# Patient Record
Sex: MALE | Race: WHITE | NOT HISPANIC OR LATINO
[De-identification: names, ages, dates, MRNs, and addresses within clinical notes are randomized per-mention and may not be internally consistent; named-entity substitution may affect disease eponyms.]

---

## 2022-07-27 PROBLEM — Z00.00 ENCOUNTER FOR PREVENTIVE HEALTH EXAMINATION: Status: ACTIVE | Noted: 2022-07-27

## 2022-07-28 ENCOUNTER — APPOINTMENT (OUTPATIENT)
Dept: ORTHOPEDIC SURGERY | Facility: CLINIC | Age: 67
End: 2022-07-28

## 2022-07-28 VITALS — HEIGHT: 71 IN | WEIGHT: 260 LBS | BODY MASS INDEX: 36.4 KG/M2

## 2022-07-28 DIAGNOSIS — Z86.79 PERSONAL HISTORY OF OTHER DISEASES OF THE CIRCULATORY SYSTEM: ICD-10-CM

## 2022-07-28 DIAGNOSIS — Z63.5 DISRUPTION OF FAMILY BY SEPARATION AND DIVORCE: ICD-10-CM

## 2022-07-28 DIAGNOSIS — F10.21 ALCOHOL DEPENDENCE, IN REMISSION: ICD-10-CM

## 2022-07-28 DIAGNOSIS — Z78.9 OTHER SPECIFIED HEALTH STATUS: ICD-10-CM

## 2022-07-28 DIAGNOSIS — Z86.39 PERSONAL HISTORY OF OTHER ENDOCRINE, NUTRITIONAL AND METABOLIC DISEASE: ICD-10-CM

## 2022-07-28 PROCEDURE — 99203 OFFICE O/P NEW LOW 30 MIN: CPT

## 2022-07-28 PROCEDURE — 73562 X-RAY EXAM OF KNEE 3: CPT | Mod: 50

## 2022-07-28 SDOH — SOCIAL STABILITY - SOCIAL INSECURITY: DISRUPTION OF FAMILY BY SEPARATION AND DIVORCE: Z63.5

## 2022-07-28 NOTE — PROCEDURE
[Large Joint Injection] : Large joint injection [Bilateral] : bilaterally of the [Knee] : knee [Pain] : pain [Inflammation] : inflammation [X-ray evidence of Osteoarthritis on this or prior visit] : x-ray evidence of Osteoarthritis on this or prior visit [Alcohol] : alcohol [Ethyl Chloride sprayed topically] : ethyl chloride sprayed topically [Sterile technique used] : sterile technique used [___ cc    3mg] :  Betamethasone (Celestone) ~Vcc of 3mg [___ cc    1%] : Lidocaine ~Vcc of 1%  [] : Patient tolerated procedure well [Call if redness, pain or fever occur] : call if redness, pain or fever occur [Apply ice for 15min out of every hour for the next 12-24 hours as tolerated] : apply ice for 15 minutes out of every hour for the next 12-24 hours as tolerated [Previous OTC use and PT nontherapeutic] : patient has tried OTC's including aspirin, Ibuprofen, Aleve, etc or prescription NSAIDS, and/or exercises at home and/or physical therapy without satisfactory response [Patient had decreased mobility in the joint] : patient had decreased mobility in the joint [Risks, benefits, alternatives discussed / Verbal consent obtained] : the risks benefits, and alternatives have been discussed, and verbal consent was obtained

## 2022-07-28 NOTE — WORK
[Other: ___] : [unfilled] [Are consistent with the injury] : are consistent with the injury [Consistent with my objective findings] : consistent with my objective findings [Reveals pre-existing condition(s) that may affect treatment/prognosis] : reveals pre-existing condition(s) that may affect treatment/prognosis [Can return to work without limitations on ______] : can return to work without limitations on [unfilled] [Patient] : patient [I provided the services listed above] :  I provided the services listed above. [No Rx restrictions] : No Rx restrictions. [Very Heavy Work:] : Very Heavy Work: Exerting in excess of 100 pounds of force occasionally, and/or in excess of 50 pounds of force frequently, and/or in excess of 20 pounds of force constantly to move objects. Physical demand requirements are in excess of those for Heavy Work [FreeTextEntry1] : no temporary impairment [FreeTextEntry2] : arthritis

## 2022-07-28 NOTE — ASSESSMENT
[FreeTextEntry1] : 68 yo male presenting with bilateral knee oa, left knee severe bone on bone oa with medial joint space narrowing and varus deformity, right knee with moderate to severe oa with medial joint space narrowing and varus alignment\par -Discussed with patient that he is indicated for left TKA, not ready for surgery at this time\par -Discussed risks, benefits, alternatives of bilateral knee intraarticular CSI, patient tolerated well\par -Discussed with patient that they are unable to have surgery for at least three months after CSI due to increased risk of infection. Patient understands.\par -Activities as tolerated\par -Rest, ice, compression, elevation, NSAIDs PRN for pain\par -All questions answered\par -F/u 2 months\par

## 2022-07-28 NOTE — HISTORY OF PRESENT ILLNESS
[Work related] : work related [7] : 7 [5] : 5 [Dull/Aching] : dull/aching [Sharp] : sharp [Throbbing] : throbbing [Intermittent] : intermittent [Household chores] : household chores [Leisure] : leisure [Sleep] : sleep [Social interactions] : social interactions [Rest] : rest [Full time] : Work status: full time [de-identified] : Patient is here for bilat knees. Patient states he tripped over an open manhole cover and fell on 11/2016. Patient states he saw a doctor at [facility]. Patient states his left knee is worse than right knee. Patient states NKI. Patient states he gets medial pain in the left knee and is lacking strength and ROM. Patient states the right knee has a dull ache.  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

## 2022-07-28 NOTE — PHYSICAL EXAM
[de-identified] : Examination of the bilateral knees is as follows:\par INSPECTION: mild effusion, but no ecchymosis, no erythema, no atrophy, no deformities of quad tendon or of patellar tendon\par PALPATION: medial joint line tenderness, lateral joint line tenderness, medial and lateral patellar facet tenderness, but no palpable mass, no obvious defects, no increased warmth, no calf tenderness\par ROM: flexion 120 degrees, but extension 0 degrees\par STRENGTH: quadriceps 5/5, hamstring 5/5\par NEURO: light touch is intact throughout\par GAIT: antalgic, but patient ambulates without assistive device\par \par X-rays of the left knee is as follows: \par Knee 3 view in the anteroposterior, lateral, skyline views:\par -Left knee: severe knee oa with medial joint space narrowing, varus alignment\par -Right knee: moderate to severe knee oa with medial joint space narrowing, varus alignment

## 2022-10-04 ENCOUNTER — APPOINTMENT (OUTPATIENT)
Dept: ORTHOPEDIC SURGERY | Facility: CLINIC | Age: 67
End: 2022-10-04

## 2022-10-04 VITALS — BODY MASS INDEX: 36.4 KG/M2 | WEIGHT: 260 LBS | HEIGHT: 71 IN

## 2022-10-04 DIAGNOSIS — M17.0 BILATERAL PRIMARY OSTEOARTHRITIS OF KNEE: ICD-10-CM

## 2022-10-04 PROCEDURE — 99455 WORK RELATED DISABILITY EXAM: CPT

## 2022-10-04 PROCEDURE — 99072 ADDL SUPL MATRL&STAF TM PHE: CPT

## 2022-10-04 NOTE — ASSESSMENT
[FreeTextEntry1] : 66 yo male presenting for SLU visit of bilateral knee oa, left knee worse than the right\par -Discussed with patient that he is indicated for left TKA, not ready for surgery at this time\par -Activities as tolerated\par -Rest, ice, compression, elevation, NSAIDs PRN for pain\par -All questions answered\par -F/u PRN\par \par SLU left knee: 10% SLU\par SLU right knee: 10% SLU\par

## 2022-10-04 NOTE — PHYSICAL EXAM
[de-identified] : Examination of the bilateral knees is as follows:\par INSPECTION: mild effusion, but no ecchymosis, no erythema, no atrophy, no deformities of quad tendon or of patellar tendon\par PALPATION: medial joint line tenderness, lateral joint line tenderness, medial and lateral patellar facet tenderness, but no palpable mass, no obvious defects, no increased warmth, no calf tenderness\par ROM: flexion 120 degrees, but extension 0 degrees\par -Left: extension 0 degrees, flexion 115 degrees\par -Right: extension 0 degrees, flexion 120 degrees\par STRENGTH: quadriceps 5/5, hamstring 5/5\par NEURO: light touch is intact throughout\par GAIT: antalgic, but patient ambulates without assistive device\par \par X-rays of the left knee is as follows: \par Knee 3 view in the anteroposterior, lateral, skyline views:\par -Left knee: severe knee oa with medial joint space narrowing, varus alignment\par -Right knee: moderate to severe knee oa with medial joint space narrowing, varus alignment

## 2022-10-04 NOTE — HISTORY OF PRESENT ILLNESS
[Work related] : work related [7] : 7 [3] : 3 [Dull/Aching] : dull/aching [Sharp] : sharp [Throbbing] : throbbing [Intermittent] : intermittent [Household chores] : household chores [Leisure] : leisure [Sleep] : sleep [Social interactions] : social interactions [Rest] : rest [Full time] : Work status: full time [de-identified] : Patient is here for bilat knees. Patient is being treated for Primary osteoarthritis of both knees. Patient had CSI on 7/28/2022. Patient states the injection helped for 6-8 weeks then only discomfort returned. Patient wears a knee brace that helps. Patient is here for an SLU. 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

## 2022-10-04 NOTE — WORK
[Other: ___] : [unfilled] [Are consistent with the injury] : are consistent with the injury [Consistent with my objective findings] : consistent with my objective findings [Reveals pre-existing condition(s) that may affect treatment/prognosis] : reveals pre-existing condition(s) that may affect treatment/prognosis [Can return to work without limitations on ______] : can return to work without limitations on [unfilled] [Patient] : patient [No Rx restrictions] : No Rx restrictions. [I provided the services listed above] :  I provided the services listed above. [Very Heavy Work:] : Very Heavy Work: Exerting in excess of 100 pounds of force occasionally, and/or in excess of 50 pounds of force frequently, and/or in excess of 20 pounds of force constantly to move objects. Physical demand requirements are in excess of those for Heavy Work [FreeTextEntry1] : no temporary impairment [FreeTextEntry2] : arthritis

## 2023-01-03 ENCOUNTER — APPOINTMENT (OUTPATIENT)
Dept: ORTHOPEDIC SURGERY | Facility: CLINIC | Age: 68
End: 2023-01-03
Payer: OTHER MISCELLANEOUS

## 2023-01-03 PROCEDURE — 73610 X-RAY EXAM OF ANKLE: CPT | Mod: RT

## 2023-01-03 PROCEDURE — 99072 ADDL SUPL MATRL&STAF TM PHE: CPT

## 2023-01-03 PROCEDURE — 99213 OFFICE O/P EST LOW 20 MIN: CPT

## 2023-01-03 NOTE — WORK
[Repetitive Strain Injury] : repetitive strain injury [Was the competent medical cause of the injury] : was the competent medical cause of the injury [Are consistent with the injury] : are consistent with the injury [Consistent with my objective findings] : consistent with my objective findings [Does not reveal pre-existing condition(s) that may affect treatment/prognosis] : does not reveal pre-existing condition(s) that may affect treatment/prognosis [Can return to work without limitations on ______] : can return to work without limitations on [unfilled] [Patient] : patient [No Rx restrictions] : No Rx restrictions. [I provided the services listed above] :  I provided the services listed above. [FreeTextEntry1] : no temporary impairment [Very Heavy Work:] : Very Heavy Work: Exerting in excess of 100 pounds of force occasionally, and/or in excess of 50 pounds of force frequently, and/or in excess of 20 pounds of force constantly to move objects. Physical demand requirements are in excess of those for Heavy Work

## 2023-01-03 NOTE — PHYSICAL EXAM
[de-identified] : Examination of the right foot and ankle is as follows:\par INSPECTION: calf muscle atrophy, mild swelling over achilles tendon, but no abrasion, no laceration, no erythema, no ecchymosis, no gross deformity, no ecchymosis of foot or ankle\par PALPATION: tenderness to palpation at midsubstance achilles tendon with associated bump\par ROM: dorsiflexion 15 degrees, plantar flexion 30 degrees, inversion 25 degrees, eversion 15 degrees\par STRENGTH: dorsiflexion 5/5, plantarflexion 3/5, inversion 5/5, eversion 5/5, EHL 5/5, FHL 5/5\par TESTING: negative Farias test\par VASCULAR: dorsalis pedis pulse: 1+, posterior tibialis pulse: 1+\par NEURO: Sensation present to light touch in all distributions\par GAIT: antalgic, but patient ambulates without assistive device\par \par X-rays of the right ankle is as follows: negative for fractures of abnormalities

## 2023-01-03 NOTE — ASSESSMENT
[FreeTextEntry1] : 66 yo male presenting with right midsubstance achilles tendinitis. Patient likely had midsubstance achilles tendon rupture approximately 4 months ago treated with inadequate non-surgical management. Patient with weakness on exam and pain.\par -Rx PT given today\par -Activities as tolerated, avoid strenuous/impact related activities\par -patient able to work full duty without restrictions at this time\par -Rest, ice, compression, elevation, NSAIDs PRN for pain. \par -All questions answered\par -F/u after MRI\par

## 2023-01-03 NOTE — HISTORY OF PRESENT ILLNESS
[Work related] : work related [Sudden] : sudden [9] : 9 [0] : 0 [Dull/Aching] : dull/aching [Shooting] : shooting [Stabbing] : stabbing [Throbbing] : throbbing [Constant] : constant [Household chores] : household chores [Leisure] : leisure [Work] : work [Social interactions] : social interactions [Rest] : rest [Full time] : Work status: full time [de-identified] : Patient is here for his right ankle. Patient states he was mopping a floor and slipped. Patient states he felt a pop in the Achilles on 9/12/2022. Patient states he has constant shooting pain when weight bearing. Patient states he went to PT for lazer treatments for approx 3 months with minimal relief. Patient denies manual PT. 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 Patient denies weakness but reports pain. [] : Post Surgical Visit: no [FreeTextEntry1] : Right ankle  [FreeTextEntry3] : 9/12/2022 [FreeTextEntry5] : Patient states he was mopping a floor and slipped. 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

## 2023-01-11 ENCOUNTER — APPOINTMENT (OUTPATIENT)
Dept: ORTHOPEDIC SURGERY | Facility: CLINIC | Age: 68
End: 2023-01-11
Payer: OTHER MISCELLANEOUS

## 2023-01-11 PROCEDURE — 73030 X-RAY EXAM OF SHOULDER: CPT | Mod: RT

## 2023-01-11 PROCEDURE — 20610 DRAIN/INJ JOINT/BURSA W/O US: CPT | Mod: RT

## 2023-01-11 PROCEDURE — 99072 ADDL SUPL MATRL&STAF TM PHE: CPT | Mod: NC

## 2023-01-11 PROCEDURE — 99214 OFFICE O/P EST MOD 30 MIN: CPT | Mod: 25

## 2023-01-11 RX ORDER — METOPROLOL TARTRATE 75 MG/1
TABLET, FILM COATED ORAL
Refills: 0 | Status: ACTIVE | COMMUNITY

## 2023-01-11 NOTE — DISCUSSION/SUMMARY
[de-identified] : (Assessment)\par \par History, clinical examination and imaging were most consistent with:\par -right glenohumeral osteoarthritis and rotator cuff tendonitis\par \par The diagnosis was explained in detail. The potential non-surgical and surgical treatments were reviewed. The relative risks and benefits of each option were considered relative to the patient’s age, activity level, medical history, symptom severity and previously attempted treatments. \par \par -Non-surgical treatment was selected. \par -Original records pertinent to the right shoulder WC claim are not available for review. As such, it is difficult to assign causality of the injury to patient's current symptoms. He has evidence of degenerative shoulder pathology. It is possible, but not certain, that the workplace injury contributed to the development of his current condition. \par -The patient will proceed with injection and PT for symptom relief for his right shoulder.\par -He is currently undergoing PT for his ankle supervised by Dr. Costa.\par -Should symptoms fail to improve, we discussed MRI to evaluate his rotator cuff and cartilage prior to consideration of surgical options. \par \par \par (Plan)\par \par -Activity modification\par -Injection: performed in the office today, details in procedure note. \par -Follow up: 6 weeks if failing to improve\par \par -Physical therapy: script provided\par -Home exercise program: packet from [employer] provided\par -Meloxicam: once daily with food, GI precautions discussed\par -Ibuprofen: twice daily with food as needed for pain, GI precautions discussed\par -Acetaminophen: three times daily as needed for pain\par -Naproxen: twice daily with food, GI precautions discussed\par -Diclofenac gel: four times daily as needed for pain\par -Physical therapy: continue as per existing script\par -Bracing: patient to obtain over the counter \par -Bracing: DME script provided \par \par \par (MDM) \par \par Problem Complexity\par -Moderate: chronic illness with exacerbation\par \par Risk\par -Moderate: injection\par \par Patient has not been seen by another provider in my practice within the past 2 year who specializes in orthopedic sports medicine, shoulder or elbow surgery. \par \par (Injection Procedure Note)\par \par Laterality \par -Right \par \par Location\par -Glenohumeral joint\par \par Contents\par -40 mg kenalog \par -5 mL of 1% lidocaine\par \par Narrative\par -Discussed possible risks of corticosteroid injection including hyperglycemia, infection and skin discoloration. \par -Discussed that due to infection risk, an interval between injection and any future surgery is 6 weeks for arthroscopy and 3 months for arthroplasty.\par -Informed consent was obtained.\par -Injection performed using aseptic technique. Tolerated well with no complications. \par \par

## 2023-01-11 NOTE — HISTORY OF PRESENT ILLNESS
[de-identified] : (New Visit)\par \par Patient Details\par -Age: 67\par -Occupation: maintenance\par -Worker’s compensation claim: yes\par -No-fault claim: no\par -School injury claim: no  \par \par \par Symptom Details \par -Body part: RT shoulder \par -Duration of symptoms:  6 years ago cannot recall the DOI. he reports exacerbation over the past few months without inciting event\par -How symptoms began: no recent injury, his initial workplace injury he fell and landed on his shoulder and knees. patient denies ever having shoulder pain prior to the workplace injury. \par -Location of pain: anterior\par -Quality of pain: sharp\par -Pain score at rest: 2/10\par -Pain score with activity: 8/10\par -Swelling: no\par -Stiffness:yes \par -Radicular symptoms (numbness or radiating pain down extremity): no\par \par \par Treatment Details\par -Activity modification: yes\par -Medication: aleve prn\par -Physical therapy: not recently\par -Home exercise program: no\par -Injection: not recently\par -MRI: no\par \par

## 2023-01-11 NOTE — WORK
[Repetitive Strain Injury] : repetitive strain injury [Was the competent medical cause of the injury] : was the competent medical cause of the injury [Are consistent with the injury] : are consistent with the injury [Consistent with my objective findings] : consistent with my objective findings [Mild Partial] : mild partial [Reveals pre-existing condition(s) that may affect treatment/prognosis] : reveals pre-existing condition(s) that may affect treatment/prognosis [Can return to work without limitations on ______] : can return to work without limitations on [unfilled] [N/A] : : Not Applicable [Patient] : patient [No Rx restrictions] : No Rx restrictions. [I provided the services listed above] :  I provided the services listed above. [Heavy Work:] : Heavy Work: Exerting 50 to 100 pounds of force occasionally, and/or 25 to 50 pounds of force frequently, and/or 10 to 20 pounds of force constantly to move objects. Physical demand requirements are in excess of those for Medium Work [FreeTextEntry1] : 50% [FreeTextEntry2] : glenohumeral osteoarthritis

## 2023-02-13 ENCOUNTER — FORM ENCOUNTER (OUTPATIENT)
Age: 68
End: 2023-02-13

## 2023-02-14 ENCOUNTER — APPOINTMENT (OUTPATIENT)
Dept: MRI IMAGING | Facility: CLINIC | Age: 68
End: 2023-02-14
Payer: OTHER MISCELLANEOUS

## 2023-02-14 ENCOUNTER — APPOINTMENT (OUTPATIENT)
Dept: ORTHOPEDIC SURGERY | Facility: CLINIC | Age: 68
End: 2023-02-14

## 2023-02-14 PROCEDURE — 99072 ADDL SUPL MATRL&STAF TM PHE: CPT

## 2023-02-14 PROCEDURE — 73721 MRI JNT OF LWR EXTRE W/O DYE: CPT | Mod: RT

## 2023-02-17 ENCOUNTER — APPOINTMENT (OUTPATIENT)
Dept: ORTHOPEDIC SURGERY | Facility: CLINIC | Age: 68
End: 2023-02-17
Payer: OTHER MISCELLANEOUS

## 2023-02-17 VITALS — HEIGHT: 71 IN | WEIGHT: 260 LBS | BODY MASS INDEX: 36.4 KG/M2

## 2023-02-17 DIAGNOSIS — M76.61 ACHILLES TENDINITIS, RIGHT LEG: ICD-10-CM

## 2023-02-17 DIAGNOSIS — M76.821 POSTERIOR TIBIAL TENDINITIS, RIGHT LEG: ICD-10-CM

## 2023-02-17 PROCEDURE — 99213 OFFICE O/P EST LOW 20 MIN: CPT

## 2023-02-17 PROCEDURE — 99072 ADDL SUPL MATRL&STAF TM PHE: CPT

## 2023-02-17 NOTE — DATA REVIEWED
[MRI] : MRI [Right] : of the right [Ankle] : ankle [I independently reviewed and interpreted images and report] : I independently reviewed and interpreted images and report [FreeTextEntry1] : LIBJ; Impression:\par 1) Circumferential enlargement and tendinopathy of the achilles from the myotendinous junction to the superior calcaneal process with a moderate grade centeral and medial 4 cm length tear from the myotendinous junction to the subtalar joint. \par 2) Peritendinous edema and bursitis. 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

## 2023-02-17 NOTE — WORK
[Repetitive Strain Injury] : repetitive strain injury [Was the competent medical cause of the injury] : was the competent medical cause of the injury [Are consistent with the injury] : are consistent with the injury [Consistent with my objective findings] : consistent with my objective findings [Does not reveal pre-existing condition(s) that may affect treatment/prognosis] : does not reveal pre-existing condition(s) that may affect treatment/prognosis [Can return to work without limitations on ______] : can return to work without limitations on [unfilled] [Patient] : patient [No Rx restrictions] : No Rx restrictions. [I provided the services listed above] :  I provided the services listed above. [Very Heavy Work:] : Very Heavy Work: Exerting in excess of 100 pounds of force occasionally, and/or in excess of 50 pounds of force frequently, and/or in excess of 20 pounds of force constantly to move objects. Physical demand requirements are in excess of those for Heavy Work [FreeTextEntry1] : no temporary impairment

## 2023-02-17 NOTE — HISTORY OF PRESENT ILLNESS
[Work related] : work related [Sudden] : sudden [9] : 9 [0] : 0 [Dull/Aching] : dull/aching [Shooting] : shooting [Stabbing] : stabbing [Throbbing] : throbbing [Constant] : constant [Household chores] : household chores [Leisure] : leisure [Work] : work [Social interactions] : social interactions [Rest] : rest [Full time] : Work status: full time [de-identified] : Patient is here to review his right ankle MRI Results at [facility]. Patient admits to feeling constant soreness. Patient admits to extreme soreness and swelling after working all day. Patient denies going to PT due to insurance issues. Patient denies taking pain medication. \par \par Impression:\par 1) Circumferential enlargement and tendinopathy of the achilles from the myotendinous junction to the superior calcaneal process with a moderate grade central and medial 4 cm length tear from the myotendinous junction to the subtalar joint. \par 2) Peritendinous edema and bursitis. 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 [] : Post Surgical Visit: no [FreeTextEntry1] : Right ankle  [FreeTextEntry3] : 9/12/2022 [FreeTextEntry5] : Patient states he was mopping a floor and slipped. 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

## 2023-02-17 NOTE — PHYSICAL EXAM
[de-identified] : Examination of the right foot and ankle is as follows:\par INSPECTION: calf muscle atrophy, mild swelling over achilles tendon, but no abrasion, no laceration, no erythema, no ecchymosis, no gross deformity, no ecchymosis of foot or ankle\par PALPATION: tenderness to palpation at midsubstance achilles tendon with associated bump\par ROM: dorsiflexion 15 degrees, plantar flexion 30 degrees, inversion 25 degrees, eversion 15 degrees\par STRENGTH: dorsiflexion 5/5, plantarflexion 3/5, inversion 5/5, eversion 5/5, EHL 5/5, FHL 5/5\par TESTING: negative Farias test\par VASCULAR: dorsalis pedis pulse: 1+, posterior tibialis pulse: 1+\par NEURO: Sensation present to light touch in all distributions\par GAIT: antalgic, but patient ambulates without assistive device\par \par X-rays of the right ankle is as follows: negative for fractures of abnormalities

## 2023-02-17 NOTE — ASSESSMENT
[FreeTextEntry1] : 66 yo male presenting for right ankle MRI viewing revealing achilles tendinitis with moderate grade central/medial/longitudinal 4 cm tear from myotendinous junction extending to subtalar joint, posterior tibial tendinitis. Recommend non-surgical management\par -Rx PT given today\par -Discussed with patient risks, benefits, alternatives of right achilles PRP injection. Strongly encouraged patient to consider injection\par -Activities as tolerated\par -Rest, ice, compression, elevation, NSAIDs PRN for pain. \par -All questions answered\par -F/u 6 weeks

## 2023-03-20 ENCOUNTER — APPOINTMENT (OUTPATIENT)
Dept: ORTHOPEDIC SURGERY | Facility: CLINIC | Age: 68
End: 2023-03-20
Payer: OTHER MISCELLANEOUS

## 2023-03-20 PROCEDURE — 20610 DRAIN/INJ JOINT/BURSA W/O US: CPT | Mod: RT

## 2023-03-20 PROCEDURE — 99214 OFFICE O/P EST MOD 30 MIN: CPT | Mod: 25

## 2023-03-20 NOTE — HISTORY OF PRESENT ILLNESS
[de-identified] : (History of Present Illness)\par \par Body part causing symptoms is the RT shoulder \par Pain score at rest is 6/10\par Pain score during activity is 8/10\par Treatments since last visit include CSI, some PT \par Compared to last visit, symptoms are about the same \par \par Comments: \par Was starting to do PT but then was told but then he was told it was denied because his birthdate was wrong.  \par He is working on getting back to PT and is interested in a new script\par Patient reports that the injection helped for about 6 weeks but that pain has slowly returned. \par He is still working full duty\par He is seeing Dr Costa for his ankle, doing PT

## 2023-03-20 NOTE — WORK
[Repetitive Strain Injury] : repetitive strain injury [Was the competent medical cause of the injury] : was the competent medical cause of the injury [Are consistent with the injury] : are consistent with the injury [Consistent with my objective findings] : consistent with my objective findings [Reveals pre-existing condition(s) that may affect treatment/prognosis] : reveals pre-existing condition(s) that may affect treatment/prognosis [Mild Partial] : mild partial [N/A] : : Not Applicable [Patient] : patient [No Rx restrictions] : No Rx restrictions. [I provided the services listed above] :  I provided the services listed above. [Heavy Work:] : Heavy Work: Exerting 50 to 100 pounds of force occasionally, and/or 25 to 50 pounds of force frequently, and/or 10 to 20 pounds of force constantly to move objects. Physical demand requirements are in excess of those for Medium Work [Can return to work without limitations on ______] : can return to work without limitations on [unfilled] [FreeTextEntry1] : 50% [FreeTextEntry2] : glenohumeral osteoarthritis

## 2023-03-20 NOTE — IMAGING
[de-identified] : (Shoulder Examination)\par \par Laterality\par -Right\par \par General\par -In no acute distress: yes\par -Alert and oriented to person, place and time: yes\par -Lymphadenopathy: no\par -Peripheral edema: no\par -Sensation grossly intact to light touch: yes\par -Capillary refill less than 2 seconds: yes \par \par Inspection\par -Skin intact: yes\par -Swelling: no\par -Atrophy: no\par -Scapular winging: no\par -AC deformity: no\par -Biceps deformity: no\par \par Tenderness to Palpation\par -Bicipital groove: no \par -AC joint: no\par -Scapulothoracic: no\par -Cervical paraspinal: no\par \par Range of Motion\par -Active forward elevation: 150\par -Passive forward elevation: 165\par -External rotation at the side: 45\par -Internal rotation behind the back: L3 \par -Cervical spine: normal\par \par Strength \par -Forward elevation: 5-\par -External rotation at the side: 5\par -Internal rotation at the side: 5\par -Deltoid: 5\par \par Special Tests\par -Lane: positive \par -O’Erickson’s: positive \par -Speed’s: negative\par -Cross body adduction: negative\par -Apprehension and relocation: negative\par -Sulcus sign: negative\par -Belly press: negative\par -Spurling’s: negative\par \par

## 2023-03-20 NOTE — DISCUSSION/SUMMARY
[de-identified] : (Assessment)\par \par History, clinical examination and imaging were most consistent with:\par -right glenohumeral osteoarthritis and rotator cuff tendonitis\par \par The diagnosis was explained in detail. The potential non-surgical and surgical treatments were reviewed. The relative risks and benefits of each option were considered relative to the patient’s age, activity level, medical history, symptom severity and previously attempted treatments. \par \par -Non-surgical treatment was selected. \par -The patient will proceed with repeat injection for symptom relief.\par -A new script for PT will be provided. \par -Work status will remain full duty. \par -Should symptoms fail to improve, we discussed MRI to evaluate his rotator cuff and cartilage prior to consideration of surgical options, including HA injections, arthroscopic debridement versus arthroplasty. \par -Follow up in 2 months for reassessment.\par \par \par \par (MDM) \par \par Problem Complexity\par -Moderate: chronic illness with exacerbation\par \par Risk\par -Moderate: injection\par \par Patient has not been seen by another provider in my practice within the past 2 year who specializes in orthopedic sports medicine, shoulder or elbow surgery. \par \par (Injection Procedure Note)\par \par Laterality \par -Right \par \par Location\par -Glenohumeral joint\par \par Contents\par -40 mg kenalog \par -5 mL of 1% lidocaine\par \par Narrative\par -Discussed possible risks of corticosteroid injection including hyperglycemia, infection and skin discoloration. \par -Discussed that due to infection risk, an interval between injection and any future surgery is 6 weeks for arthroscopy and 3 months for arthroplasty.\par -Informed consent was obtained.\par -Injection performed using aseptic technique. Tolerated well with no complications. \par

## 2023-03-22 NOTE — IMAGING
[Right] : right shoulder [de-identified] : (Shoulder Examination)\par \par Laterality\par -Right\par \par General\par -In no acute distress: yes\par -Alert and oriented to person, place and time: yes\par -Lymphadenopathy: no\par -Peripheral edema: no\par -Sensation grossly intact to light touch: yes\par -Capillary refill less than 2 seconds: yes \par \par Inspection\par -Skin intact: yes\par -Swelling: no\par -Atrophy: no\par -Scapular winging: no\par -AC deformity: no\par -Biceps deformity: no\par \par Tenderness to Palpation\par -Bicipital groove: no \par -AC joint: no\par -Scapulothoracic: no\par -Cervical paraspinal: no\par \par Range of Motion\par -Active forward elevation: 150\par -Passive forward elevation: 165\par -External rotation at the side: 45\par -Internal rotation behind the back: L3 \par -Cervical spine: normal\par \par Strength \par -Forward elevation: 5-\par -External rotation at the side: 5\par -Internal rotation at the side: 5\par -Deltoid: 5\par \par Special Tests\par -Lane: positive \par -O’Erickson’s: positive \par -Speed’s: negative\par -Cross body adduction: negative\par -Apprehension and relocation: negative\par -Sulcus sign: negative\par -Belly press: negative\par -Spurling’s: negative\par \par  [FreeTextEntry1] : mild to moderate GH OA, no fracture, type 2 acromion Negative

## 2023-03-30 ENCOUNTER — APPOINTMENT (OUTPATIENT)
Dept: ORTHOPEDIC SURGERY | Facility: CLINIC | Age: 68
End: 2023-03-30

## 2023-05-24 ENCOUNTER — APPOINTMENT (OUTPATIENT)
Dept: ORTHOPEDIC SURGERY | Facility: CLINIC | Age: 68
End: 2023-05-24
Payer: OTHER MISCELLANEOUS

## 2023-05-24 DIAGNOSIS — M19.011 PRIMARY OSTEOARTHRITIS, RIGHT SHOULDER: ICD-10-CM

## 2023-05-24 DIAGNOSIS — S46.011A STRAIN OF MUSCLE(S) AND TENDON(S) OF THE ROTATOR CUFF OF RIGHT SHOULDER, INITIAL ENCOUNTER: ICD-10-CM

## 2023-05-24 PROCEDURE — 99214 OFFICE O/P EST MOD 30 MIN: CPT

## 2023-05-24 RX ORDER — OMEGA-3-ACID ETHYL ESTERS CAPSULES 1 G/1
1 CAPSULE, LIQUID FILLED ORAL
Qty: 120 | Refills: 0 | Status: ACTIVE | COMMUNITY
Start: 2023-02-22

## 2023-05-24 RX ORDER — METOPROLOL SUCCINATE 50 MG/1
50 TABLET, EXTENDED RELEASE ORAL
Qty: 60 | Refills: 0 | Status: ACTIVE | COMMUNITY
Start: 2023-04-25

## 2023-05-24 RX ORDER — ROSUVASTATIN CALCIUM 20 MG/1
20 TABLET, FILM COATED ORAL
Qty: 30 | Refills: 0 | Status: ACTIVE | COMMUNITY
Start: 2023-03-13

## 2023-05-24 RX ORDER — HYDRALAZINE HYDROCHLORIDE 50 MG/1
50 TABLET ORAL
Qty: 60 | Refills: 0 | Status: ACTIVE | COMMUNITY
Start: 2023-01-19

## 2023-05-24 RX ORDER — ERGOCALCIFEROL 1.25 MG/1
1.25 MG CAPSULE, LIQUID FILLED ORAL
Qty: 4 | Refills: 0 | Status: ACTIVE | COMMUNITY
Start: 2023-02-22

## 2023-05-24 RX ORDER — CYCLOBENZAPRINE HYDROCHLORIDE 5 MG/1
5 TABLET, FILM COATED ORAL
Qty: 15 | Refills: 2 | Status: ACTIVE | COMMUNITY
Start: 2023-05-24 | End: 1900-01-01

## 2023-05-24 RX ORDER — FENOFIBRATE 160 MG/1
160 TABLET ORAL
Qty: 30 | Refills: 0 | Status: ACTIVE | COMMUNITY
Start: 2023-03-13

## 2023-05-24 NOTE — HISTORY OF PRESENT ILLNESS
[de-identified] : (History of Present Illness)\par \par Body part causing symptoms is the RT shoulder \par Pain score at rest is 6/10\par Pain score during activity is 8/10\par Treatments since last visit include CSI \par He has not yet attended PT\par Compared to last visit, symptoms are about the same \par He is still working full duty\par

## 2023-05-24 NOTE — DISCUSSION/SUMMARY
[de-identified] : (Assessment)\par \par History, clinical examination and imaging were most consistent with:\par -right glenohumeral osteoarthritis and rotator cuff strain \par \par The diagnosis was explained in detail. The potential non-surgical and surgical treatments were reviewed. The relative risks and benefits of each option were considered relative to the patient’s age, activity level, medical history, symptom severity and previously attempted treatments. \par \par -Non-surgical treatment was selected. \par -Patient to proceed with PT at his convenience. \par -Work status will remain full duty. \par -MRI will be obtained to evaluate his rotator cuff and cartilage.\par -Flexeril and voltaren gel provided for symptom relief.\par -Patient expressed preference to avoid surgery.  \par -Follow up in 2 months for reassessment. We would consider repeat injection of CSI versus HA at that time. \par \par \par \par (MDM) \par \par Problem Complexity\par -Moderate: chronic illness with exacerbation\par \par Risk\par -Moderate: prescription medication\par

## 2023-05-24 NOTE — IMAGING
[de-identified] : (Shoulder Examination)\par \par Laterality\par -Right\par \par General\par -In no acute distress: yes\par -Alert and oriented to person, place and time: yes\par -Lymphadenopathy: no\par -Peripheral edema: no\par -Sensation grossly intact to light touch: yes\par -Capillary refill less than 2 seconds: yes \par \par Inspection\par -Skin intact: yes\par -Swelling: no\par -Atrophy: no\par -Scapular winging: no\par -AC deformity: no\par -Biceps deformity: no\par \par Tenderness to Palpation\par -Bicipital groove: no \par -AC joint: no\par -Scapulothoracic: no\par -Cervical paraspinal: no\par \par Range of Motion\par -Active forward elevation: 150\par -Passive forward elevation: 165\par -External rotation at the side: 45\par -Internal rotation behind the back: L3 \par -Cervical spine: normal\par \par Strength \par -Forward elevation: 5-\par -External rotation at the side: 5\par -Internal rotation at the side: 5\par -Deltoid: 5\par \par Special Tests\par -Lane: positive \par -O’Erickson’s: positive \par -Speed’s: negative\par -Cross body adduction: negative\par -Apprehension and relocation: negative\par -Sulcus sign: negative\par -Belly press: negative\par -Spurling’s: negative\par \par

## 2023-06-01 ENCOUNTER — APPOINTMENT (OUTPATIENT)
Dept: ORTHOPEDIC SURGERY | Facility: CLINIC | Age: 68
End: 2023-06-01

## 2023-07-31 ENCOUNTER — RX RENEWAL (OUTPATIENT)
Age: 68
End: 2023-07-31

## 2023-07-31 RX ORDER — DICLOFENAC SODIUM 1% 10 MG/G
1 GEL TOPICAL
Qty: 100 | Refills: 0 | Status: ACTIVE | COMMUNITY
Start: 2023-05-24 | End: 1900-01-01

## 2023-12-15 ENCOUNTER — APPOINTMENT (OUTPATIENT)
Dept: ORTHOPEDIC SURGERY | Facility: CLINIC | Age: 68
End: 2023-12-15

## 2023-12-21 ENCOUNTER — APPOINTMENT (OUTPATIENT)
Dept: ORTHOPEDIC SURGERY | Facility: CLINIC | Age: 68
End: 2023-12-21